# Patient Record
Sex: FEMALE | Race: WHITE | URBAN - METROPOLITAN AREA
[De-identification: names, ages, dates, MRNs, and addresses within clinical notes are randomized per-mention and may not be internally consistent; named-entity substitution may affect disease eponyms.]

---

## 2020-06-15 ENCOUNTER — OFFICE VISIT (OUTPATIENT)
Dept: INTERNAL MEDICINE CLINIC | Facility: CLINIC | Age: 30
End: 2020-06-15

## 2020-06-15 VITALS
RESPIRATION RATE: 19 BRPM | HEART RATE: 73 BPM | HEIGHT: 60 IN | SYSTOLIC BLOOD PRESSURE: 116 MMHG | WEIGHT: 120 LBS | DIASTOLIC BLOOD PRESSURE: 70 MMHG | OXYGEN SATURATION: 99 % | BODY MASS INDEX: 23.56 KG/M2

## 2020-06-15 DIAGNOSIS — R63.5 ABNORMAL WEIGHT GAIN: Primary | ICD-10-CM

## 2020-06-15 DIAGNOSIS — F32.A DEPRESSION, UNSPECIFIED DEPRESSION TYPE: ICD-10-CM

## 2020-06-15 PROCEDURE — 99203 OFFICE O/P NEW LOW 30 MIN: CPT | Performed by: INTERNAL MEDICINE

## 2020-06-15 RX ORDER — QUETIAPINE 25 MG/1
TABLET, FILM COATED ORAL
COMMUNITY
Start: 2020-06-11

## 2020-06-15 RX ORDER — METFORMIN HYDROCHLORIDE 750 MG/1
750 TABLET, EXTENDED RELEASE ORAL 2 TIMES DAILY WITH MEALS
Qty: 60 TABLET | Refills: 1 | Status: SHIPPED | OUTPATIENT
Start: 2020-06-15 | End: 2020-07-21

## 2020-06-15 RX ORDER — ESCITALOPRAM OXALATE 10 MG/1
10 TABLET ORAL DAILY
COMMUNITY
Start: 2020-05-04

## 2020-06-15 RX ORDER — DROSPIRENONE AND ETHINYL ESTRADIOL 0.02-3(28)
1 KIT ORAL DAILY
COMMUNITY
Start: 2020-05-24

## 2020-06-15 NOTE — PATIENT INSTRUCTIONS
DIANE    Welcome to [facility]. We are excited that you are committed to improving your health and have invited our practice to be part of your journey.  Our approach to the medical management of weight loss is similar to that of other ch water per day, add fiber ( benefiber) to the water to increase fullness, overcome constipation    · Eat slowly    · Do not drink your calories ( no regular pop, juice, high calorie coffee drinks, limit alcohol) Also stay away from artificially sweetened be

## 2020-06-15 NOTE — PROGRESS NOTES
Remigio Yo is a 30year old female. Chief complaint:  weight loss management and obesity related comorbidities    HPI:     Remigio Yo is a 30year old female new to our office today.   with PMH as listed below here for weight management     Tejas Frederick History:  Social History    Tobacco Use      Smoking status: Not on file    Alcohol use: Not on file    Drug use: Not on file    No family history on file. There is no problem list on file for this patient.               REVIEW OF SYSTEMS:   A comprehensiv of exercise for weight loss goal is 150 min per week   · don't eat at late night   · Discussed resources for low carb meal   · Discussed the importance of sleep and reducing stress for weight loss  · Discussed quetiapine and lexapro can both cause weight g

## 2020-07-21 ENCOUNTER — OFFICE VISIT (OUTPATIENT)
Dept: INTERNAL MEDICINE CLINIC | Facility: CLINIC | Age: 30
End: 2020-07-21
Payer: COMMERCIAL

## 2020-07-21 VITALS
DIASTOLIC BLOOD PRESSURE: 62 MMHG | BODY MASS INDEX: 22.19 KG/M2 | HEART RATE: 69 BPM | HEIGHT: 60 IN | WEIGHT: 113 LBS | SYSTOLIC BLOOD PRESSURE: 110 MMHG | OXYGEN SATURATION: 99 %

## 2020-07-21 DIAGNOSIS — R63.5 ABNORMAL WEIGHT GAIN: Primary | ICD-10-CM

## 2020-07-21 DIAGNOSIS — F32.A DEPRESSION, UNSPECIFIED DEPRESSION TYPE: ICD-10-CM

## 2020-07-21 PROCEDURE — 3078F DIAST BP <80 MM HG: CPT | Performed by: INTERNAL MEDICINE

## 2020-07-21 PROCEDURE — 99213 OFFICE O/P EST LOW 20 MIN: CPT | Performed by: INTERNAL MEDICINE

## 2020-07-21 PROCEDURE — 3074F SYST BP LT 130 MM HG: CPT | Performed by: INTERNAL MEDICINE

## 2020-07-21 PROCEDURE — 3008F BODY MASS INDEX DOCD: CPT | Performed by: INTERNAL MEDICINE

## 2020-07-21 RX ORDER — METFORMIN HYDROCHLORIDE 750 MG/1
750 TABLET, EXTENDED RELEASE ORAL 2 TIMES DAILY WITH MEALS
Qty: 60 TABLET | Refills: 1 | Status: SHIPPED | OUTPATIENT
Start: 2020-07-21 | End: 2020-09-26

## 2020-07-21 NOTE — PROGRESS NOTES
Crhis Garner is a 26year old female.     Chief complaint:weight loss follow up , medication refill, therapeutic drug monitoring, abnormal weight gain    HPI:   Shlomo Dias here for follow up on weight loss   Wt Readings from Last 12 Encounters:  07/21/20 : 1 EXAM:   /62   Pulse 69   Ht 60\"   Wt 113 lb (51.3 kg)   LMP 07/16/2020 (Approximate)   SpO2 99%   BMI 22.07 kg/m²   GENERAL: well developed, well nourished,in no apparent distress  SKIN: no rashes,no suspicious lesions  HEENT: atraumatic, normocepha of Obesity Medicine

## 2020-07-21 NOTE — PATIENT INSTRUCTIONS
Build Muscle & Lose Fat  The great fat vs muscle diagram below paints a clear picture of why it’s so important for you to build muscle in order to lose fat.   Maybe Everette Finn wondered about muscle vs fat and why you need to build muscle to lose fat, look slim 3. Build confidence. Strong muscles and joints increase your level of confidence in your abilities to perform many lifestyle activities. 4. Prevent injury.  Strength training can build stronger muscles and more limber, flexible joints, which play a crucial

## 2020-09-26 ENCOUNTER — VIRTUAL PHONE E/M (OUTPATIENT)
Dept: INTERNAL MEDICINE CLINIC | Facility: CLINIC | Age: 30
End: 2020-09-26
Payer: COMMERCIAL

## 2020-09-26 DIAGNOSIS — R63.5 ABNORMAL WEIGHT GAIN: Primary | ICD-10-CM

## 2020-09-26 DIAGNOSIS — F32.A DEPRESSION, UNSPECIFIED DEPRESSION TYPE: ICD-10-CM

## 2020-09-26 PROCEDURE — 99213 OFFICE O/P EST LOW 20 MIN: CPT | Performed by: INTERNAL MEDICINE

## 2020-09-26 RX ORDER — METFORMIN HYDROCHLORIDE 750 MG/1
750 TABLET, EXTENDED RELEASE ORAL 2 TIMES DAILY WITH MEALS
Qty: 180 TABLET | Refills: 1 | Status: SHIPPED | OUTPATIENT
Start: 2020-09-26 | End: 2020-12-25

## 2021-04-11 DIAGNOSIS — Z23 NEED FOR VACCINATION: ICD-10-CM

## 2021-05-10 DIAGNOSIS — R63.5 ABNORMAL WEIGHT GAIN: ICD-10-CM

## 2021-05-10 DIAGNOSIS — F32.A DEPRESSION, UNSPECIFIED DEPRESSION TYPE: ICD-10-CM

## 2021-05-10 RX ORDER — METFORMIN HYDROCHLORIDE 750 MG/1
TABLET, EXTENDED RELEASE ORAL
Qty: 180 TABLET | Refills: 1 | OUTPATIENT
Start: 2021-05-10